# Patient Record
Sex: MALE | Race: WHITE | NOT HISPANIC OR LATINO | Employment: FULL TIME | URBAN - METROPOLITAN AREA
[De-identification: names, ages, dates, MRNs, and addresses within clinical notes are randomized per-mention and may not be internally consistent; named-entity substitution may affect disease eponyms.]

---

## 2024-06-26 ENCOUNTER — APPOINTMENT (OUTPATIENT)
Dept: PRIMARY CARE | Facility: CLINIC | Age: 44
End: 2024-06-26
Payer: COMMERCIAL

## 2024-06-26 ENCOUNTER — LAB (OUTPATIENT)
Dept: LAB | Facility: LAB | Age: 44
End: 2024-06-26
Payer: COMMERCIAL

## 2024-06-26 VITALS
OXYGEN SATURATION: 98 % | BODY MASS INDEX: 26.41 KG/M2 | DIASTOLIC BLOOD PRESSURE: 79 MMHG | HEIGHT: 72 IN | HEART RATE: 71 BPM | WEIGHT: 195 LBS | SYSTOLIC BLOOD PRESSURE: 121 MMHG

## 2024-06-26 DIAGNOSIS — E78.5 DYSLIPIDEMIA: ICD-10-CM

## 2024-06-26 DIAGNOSIS — Z78.9 UNHEALTHY ALCOHOL CONSUMPTION: ICD-10-CM

## 2024-06-26 DIAGNOSIS — Z00.00 ENCOUNTER FOR PREVENTATIVE ADULT HEALTH CARE EXAMINATION: ICD-10-CM

## 2024-06-26 DIAGNOSIS — F41.0 PANIC: ICD-10-CM

## 2024-06-26 DIAGNOSIS — Z71.84 COUNSELING FOR TRAVEL: Primary | ICD-10-CM

## 2024-06-26 DIAGNOSIS — Z01.84 IMMUNITY STATUS TESTING: ICD-10-CM

## 2024-06-26 LAB
25(OH)D3 SERPL-MCNC: 42 NG/ML (ref 30–100)
ALBUMIN SERPL BCP-MCNC: 4.8 G/DL (ref 3.4–5)
ALP SERPL-CCNC: 43 U/L (ref 33–120)
ALT SERPL W P-5'-P-CCNC: 23 U/L (ref 10–52)
ANION GAP SERPL CALC-SCNC: 14 MMOL/L (ref 10–20)
AST SERPL W P-5'-P-CCNC: 17 U/L (ref 9–39)
BASOPHILS # BLD AUTO: 0.04 X10*3/UL (ref 0–0.1)
BASOPHILS NFR BLD AUTO: 0.8 %
BILIRUB SERPL-MCNC: 0.6 MG/DL (ref 0–1.2)
BUN SERPL-MCNC: 15 MG/DL (ref 6–23)
CALCIUM SERPL-MCNC: 10 MG/DL (ref 8.6–10.6)
CHLORIDE SERPL-SCNC: 104 MMOL/L (ref 98–107)
CHOLEST SERPL-MCNC: 240 MG/DL (ref 0–199)
CHOLESTEROL/HDL RATIO: 3.5
CO2 SERPL-SCNC: 27 MMOL/L (ref 21–32)
CREAT SERPL-MCNC: 1.11 MG/DL (ref 0.5–1.3)
EGFRCR SERPLBLD CKD-EPI 2021: 85 ML/MIN/1.73M*2
EOSINOPHIL # BLD AUTO: 0.01 X10*3/UL (ref 0–0.7)
EOSINOPHIL NFR BLD AUTO: 0.2 %
ERYTHROCYTE [DISTWIDTH] IN BLOOD BY AUTOMATED COUNT: 13.2 % (ref 11.5–14.5)
EST. AVERAGE GLUCOSE BLD GHB EST-MCNC: 103 MG/DL
GLUCOSE SERPL-MCNC: 98 MG/DL (ref 74–99)
HBA1C MFR BLD: 5.2 %
HBV SURFACE AB SER-ACNC: <3.1 MIU/ML
HCT VFR BLD AUTO: 45.9 % (ref 41–52)
HDLC SERPL-MCNC: 69.5 MG/DL
HGB BLD-MCNC: 15.4 G/DL (ref 13.5–17.5)
IMM GRANULOCYTES # BLD AUTO: 0.02 X10*3/UL (ref 0–0.7)
IMM GRANULOCYTES NFR BLD AUTO: 0.4 % (ref 0–0.9)
LDLC SERPL CALC-MCNC: 152 MG/DL
LYMPHOCYTES # BLD AUTO: 1.87 X10*3/UL (ref 1.2–4.8)
LYMPHOCYTES NFR BLD AUTO: 39.4 %
MCH RBC QN AUTO: 29.8 PG (ref 26–34)
MCHC RBC AUTO-ENTMCNC: 33.6 G/DL (ref 32–36)
MCV RBC AUTO: 89 FL (ref 80–100)
MONOCYTES # BLD AUTO: 0.37 X10*3/UL (ref 0.1–1)
MONOCYTES NFR BLD AUTO: 7.8 %
NEUTROPHILS # BLD AUTO: 2.44 X10*3/UL (ref 1.2–7.7)
NEUTROPHILS NFR BLD AUTO: 51.4 %
NON HDL CHOLESTEROL: 171 MG/DL (ref 0–149)
NRBC BLD-RTO: 0 /100 WBCS (ref 0–0)
PLATELET # BLD AUTO: 284 X10*3/UL (ref 150–450)
POTASSIUM SERPL-SCNC: 4.3 MMOL/L (ref 3.5–5.3)
PROT SERPL-MCNC: 7.2 G/DL (ref 6.4–8.2)
RBC # BLD AUTO: 5.17 X10*6/UL (ref 4.5–5.9)
SODIUM SERPL-SCNC: 141 MMOL/L (ref 136–145)
TRIGL SERPL-MCNC: 92 MG/DL (ref 0–149)
TSH SERPL-ACNC: 1.07 MIU/L (ref 0.44–3.98)
VLDL: 18 MG/DL (ref 0–40)
WBC # BLD AUTO: 4.8 X10*3/UL (ref 4.4–11.3)

## 2024-06-26 PROCEDURE — 84443 ASSAY THYROID STIM HORMONE: CPT

## 2024-06-26 PROCEDURE — 1036F TOBACCO NON-USER: CPT | Performed by: INTERNAL MEDICINE

## 2024-06-26 PROCEDURE — 83036 HEMOGLOBIN GLYCOSYLATED A1C: CPT

## 2024-06-26 PROCEDURE — 85025 COMPLETE CBC W/AUTO DIFF WBC: CPT

## 2024-06-26 PROCEDURE — 82172 ASSAY OF APOLIPOPROTEIN: CPT

## 2024-06-26 PROCEDURE — 99396 PREV VISIT EST AGE 40-64: CPT | Performed by: INTERNAL MEDICINE

## 2024-06-26 PROCEDURE — 82306 VITAMIN D 25 HYDROXY: CPT

## 2024-06-26 PROCEDURE — 36415 COLL VENOUS BLD VENIPUNCTURE: CPT

## 2024-06-26 PROCEDURE — 86706 HEP B SURFACE ANTIBODY: CPT

## 2024-06-26 PROCEDURE — 80053 COMPREHEN METABOLIC PANEL: CPT

## 2024-06-26 PROCEDURE — 80061 LIPID PANEL: CPT

## 2024-06-26 RX ORDER — CLONAZEPAM 1 MG/1
TABLET ORAL
Qty: 30 TABLET | Refills: 0 | Status: SHIPPED | OUTPATIENT
Start: 2024-06-26

## 2024-06-26 RX ORDER — CHLORHEXIDINE GLUCONATE ORAL RINSE 1.2 MG/ML
SOLUTION DENTAL
COMMUNITY
Start: 2024-01-02 | End: 2024-06-26 | Stop reason: WASHOUT

## 2024-06-26 RX ORDER — AMOXICILLIN AND CLAVULANATE POTASSIUM 875; 125 MG/1; MG/1
1 TABLET, FILM COATED ORAL
COMMUNITY
Start: 2024-03-30 | End: 2024-06-26 | Stop reason: WASHOUT

## 2024-06-26 RX ORDER — ATORVASTATIN CALCIUM 20 MG/1
1 TABLET, FILM COATED ORAL DAILY
COMMUNITY
Start: 2021-12-07 | End: 2024-06-26 | Stop reason: WASHOUT

## 2024-06-26 ASSESSMENT — PATIENT HEALTH QUESTIONNAIRE - PHQ9
SUM OF ALL RESPONSES TO PHQ9 QUESTIONS 1 AND 2: 0
2. FEELING DOWN, DEPRESSED OR HOPELESS: NOT AT ALL
1. LITTLE INTEREST OR PLEASURE IN DOING THINGS: NOT AT ALL

## 2024-06-26 ASSESSMENT — PAIN SCALES - GENERAL: PAINLEVEL: 0-NO PAIN

## 2024-06-26 NOTE — ASSESSMENT & PLAN NOTE
Travel phobia and anxiety  Will initiate clonazepam prior to travel.  Potential side effects management expectations reviewed.  Use sparingly.  CSA signed 6/24

## 2024-06-26 NOTE — ASSESSMENT & PLAN NOTE
Calcium score in 2020 of 0 previously on atorvastatin 20 mg tolerated side effects.  Strong family history of ASCVD  Check LP(a)  Recheck lipid profile and likely reinstitute atorvastatin, dosage depending on levels found.  Repeat calcium score next year

## 2024-06-26 NOTE — PATIENT INSTRUCTIONS
Jurgen,  I have ordered blood and/or urine tests for you to do today. The lab can be found on this floor (2nd floor) next to the pharmacy across from the elevators.    Reduce alcohol intake   Clonazepam has been sent to your pharmacy. Use 60 minutes prior to travel.

## 2024-06-26 NOTE — ASSESSMENT & PLAN NOTE
Does not satisfy criteria for AUD, strongly recommended reduction in alcohol intake intake.  Impact of alcohol on cardiovascular health.

## 2024-06-26 NOTE — PROGRESS NOTES
Subjective     Patient ID: Jurgen Desir is a 44 y.o. male who presents for New Patient Visit and Establish Care (/).  HPI  44-year-old male new here for establishment of care for preventive care visit.  Previously seen by Dr. Craig,  last seen 12/21.    [facility] day woke up in the middle of the night with palpitations which caused panic symptoms seen by EMS initially evaluated found no concerning abnormalities thought perhaps related to panic attack did not get seen in the EMS. Admits this occurring during periods of stress     Past medical history  -HLD- previously atorvastatin 20 mg discontinued after did not followup, +strong paternal family history of heart disease, CAC 0 in 2020   - Travel anxiety - prefers not to drink when coping on the plane. Gets palpitations, diaphoresis. Farthest he goes is [place], has tried benzo in the past wihtout adverse events.     Social:   - Former smoker a few cigarettes a day late teens to early 20s quit   - Alcohol - 2-3 glasses of wine a night, trying to reduce to weekends only. Typically used as a stress response, enjoyment factor between his wife.   -  operations for [employer]   - Lives with wife and 2 kids (7 and 2), currently undergoing house renovation.     Lifestyle   - Diet - does intermittent fasting, coffee in morning, protein shake at lunch, healthy dinner that he cooks. Likes to eat healthy. Perhaps too much red meat on weekends, lots of fish and chicken. When drinks alcohol eats kids snacks.   - Exercise - 5d/week, resistance bands at higher heart rate or weights, may do 10 minutes on elliptical, heart rate 130-150bpm.   -Sleep  - well, 7-8 hours no interruption.   Review of Systems  General: No constitutional symptoms, significant changes in weight  HEENT: No headaches, changes in vision or hearing, no sinus or dental issues   Cardiac: No chest pain, palpitations, dyspnea on exertion   Lungs: No cough, wheezing, shortness of breath   Abdomen: No  abdominal pain, nausea/vomiting, diarrhea or constipation   : No urinary complaints   Musculoskeletal: no joint pains, swelling or tenderness   Heme: No bleeding or thrombosis issues   Lymph: No swollen lymph glands   Skin: No rashes or lesions   Psych: No reported anxiety or depression   All other systems reviewed and are negative      Objective       Current Outpatient Medications:     clonazePAM (KlonoPIN) 1 mg tablet, Take 1 tablet as needed 1 hour prior to flight, Disp: 30 tablet, Rfl: 0    /79   Pulse 71   Ht 1.829 m (6')   Wt 88.5 kg (195 lb)   SpO2 98%   BMI 26.45 kg/m²       Physical Examination  General: Awake, alert, appears stated age   Head/eyes/ears: NCAT, EOMI, PERRL, TM WNL, no cerumen  Throat: mucus membranes moist, pharynx unremarkable   Neck: Supple, nontender, no lymphadenopathy, thyroid exam unremarkable   Heart: RRR, no murmurs, rubs or gallops  Lungs: No wheezes, rhonchi or whales,  Abdomen: Soft, NT/ND  Extremities: No edema, 2+ DP pulses   Testicular exam offered and declined  Skin: No concerning skin lesions on visualized skin   Neuro: AAO x 3, no FND, gait unremarkable      Assessment/Plan   Problem List Items Addressed This Visit      Adult Health Examination  Age appropriate screening performed   Healthy lifestyle reviewed.   Depression screen negative   No additional pertinent family history or toxic habits   No high risk sexual behavior declines STI screen has had one time HCV and HIV tests   Cancer screen   - Colonoscopy at 45  - PSA screen at 50  - Discussed testicular self exams  - Skin cancer prevention strategies reviewed   Immunizations: Flu UTD, Tdap today, consider COVID booster, screen for HBV immunity   Dental and visual exams UTD   Discussed adequate vitamin D intake.      Counseling for travel - Primary     Travel phobia and anxiety  Will initiate clonazepam prior to travel.  Potential side effects management expectations reviewed.  Use sparingly.  CSA signed  6/24              Relevant Medications    clonazePAM (KlonoPIN) 1 mg tablet    Dyslipidemia     Calcium score in 2020 of 0 previously on atorvastatin 20 mg tolerated side effects.  Strong family history of ASCVD  Check LP(a)  Recheck lipid profile and likely reinstitute atorvastatin, dosage depending on levels found.  Repeat calcium score next year         Relevant Orders    Lipoprotein A (LPA)    Unhealthy alcohol consumption     Does not satisfy criteria for AUD, strongly recommended reduction in alcohol intake intake.  Impact of alcohol on cardiovascular health.          Other Visit Diagnoses       Encounter for preventative adult health care examination        Relevant Orders    Hemoglobin A1C    Comprehensive Metabolic Panel    CBC and Auto Differential    TSH with reflex to Free T4 if abnormal    Vitamin D 25-Hydroxy,Total (for eval of Vitamin D levels)    Lipid Panel    Follow Up In [facility] - PCP - Health Maintenance    Immunity status testing        Relevant Orders    Hepatitis B Surface Antibody    Panic        In the setting of multiple life stressors, declines anxiety or depression.  No further episodes.  Not interested in further workup at this time.          Followup 1 year or as needed

## 2024-06-28 LAB — LPA SERPL-MCNC: 148 MG/DL

## 2024-06-30 DIAGNOSIS — E78.5 DYSLIPIDEMIA: Primary | ICD-10-CM

## 2024-06-30 DIAGNOSIS — Z00.00 ENCOUNTER FOR PREVENTATIVE ADULT HEALTH CARE EXAMINATION: ICD-10-CM

## 2024-06-30 RX ORDER — ATORVASTATIN CALCIUM 20 MG/1
20 TABLET, FILM COATED ORAL DAILY
Qty: 90 TABLET | Refills: 0 | Status: SHIPPED | OUTPATIENT
Start: 2024-06-30

## 2024-10-14 DIAGNOSIS — E78.5 DYSLIPIDEMIA: ICD-10-CM

## 2024-10-14 RX ORDER — ATORVASTATIN CALCIUM 20 MG/1
20 TABLET, FILM COATED ORAL DAILY
Qty: 90 TABLET | Refills: 0 | Status: SHIPPED | OUTPATIENT
Start: 2024-10-14

## 2025-02-04 DIAGNOSIS — E78.5 DYSLIPIDEMIA: ICD-10-CM

## 2025-02-04 RX ORDER — ATORVASTATIN CALCIUM 20 MG/1
20 TABLET, FILM COATED ORAL DAILY
Qty: 90 TABLET | Refills: 1 | Status: SHIPPED | OUTPATIENT
Start: 2025-02-04

## 2025-06-17 ENCOUNTER — APPOINTMENT (OUTPATIENT)
Dept: PRIMARY CARE | Facility: CLINIC | Age: 45
End: 2025-06-17
Payer: COMMERCIAL

## 2025-06-17 VITALS
HEART RATE: 74 BPM | HEIGHT: 73 IN | WEIGHT: 202.2 LBS | BODY MASS INDEX: 26.8 KG/M2 | SYSTOLIC BLOOD PRESSURE: 123 MMHG | TEMPERATURE: 97.2 F | DIASTOLIC BLOOD PRESSURE: 79 MMHG

## 2025-06-17 DIAGNOSIS — Z71.84 COUNSELING FOR TRAVEL: ICD-10-CM

## 2025-06-17 DIAGNOSIS — Z00.00 ENCOUNTER FOR PREVENTATIVE ADULT HEALTH CARE EXAMINATION: Primary | ICD-10-CM

## 2025-06-17 DIAGNOSIS — Z23 IMMUNIZATION DUE: ICD-10-CM

## 2025-06-17 DIAGNOSIS — E78.5 DYSLIPIDEMIA: ICD-10-CM

## 2025-06-17 PROCEDURE — 1036F TOBACCO NON-USER: CPT | Performed by: INTERNAL MEDICINE

## 2025-06-17 PROCEDURE — 90471 IMMUNIZATION ADMIN: CPT | Performed by: INTERNAL MEDICINE

## 2025-06-17 PROCEDURE — 90739 HEPB VACC 2/4 DOSE ADULT IM: CPT | Performed by: INTERNAL MEDICINE

## 2025-06-17 PROCEDURE — 99396 PREV VISIT EST AGE 40-64: CPT | Performed by: INTERNAL MEDICINE

## 2025-06-17 PROCEDURE — 3008F BODY MASS INDEX DOCD: CPT | Performed by: INTERNAL MEDICINE

## 2025-06-17 RX ORDER — CLONAZEPAM 1 MG/1
TABLET ORAL
Qty: 30 TABLET | Refills: 0 | Status: SHIPPED | OUTPATIENT
Start: 2025-06-17

## 2025-06-17 ASSESSMENT — PATIENT HEALTH QUESTIONNAIRE - PHQ9
2. FEELING DOWN, DEPRESSED OR HOPELESS: NOT AT ALL
SUM OF ALL RESPONSES TO PHQ9 QUESTIONS 1 & 2: 0
1. LITTLE INTEREST OR PLEASURE IN DOING THINGS: NOT AT ALL

## 2025-06-17 ASSESSMENT — PAIN SCALES - GENERAL: PAINLEVEL_OUTOF10: 0-NO PAIN

## 2025-06-17 NOTE — PATIENT INSTRUCTIONS
Jurgen,   Labs including bloodwork and/or urine is ordered today. The lab can be found on this floor (2nd floor) next to the pharmacy across from the elevators.    Continue healthy lifestyle - work on 7-8 hours uninterrupted sleep     Followup 1 year

## 2025-06-17 NOTE — PROGRESS NOTES
Subjective   Patient ID: Jurgen Desir is a 45 y.o. male who presents for Follow-up and Annual Exam.  History of Present Illness  45-year-old male here for preventive care visit, last seen last year.    6/24: lpa high, ldl 154 start atorvastatin 20mg.   Hep b not immune.     He has been taking his cholesterol medication regularly without any side effects. However, he has not had recent blood work to monitor his cholesterol levels. No chest pain, palpitations, or shortness of breath.    His anxiety medication is effective, particularly during flights, as it aids in sleep. He is nearly out of this medication and requires a refill.    He has significantly reduced his alcohol intake from twenty-one drinks per week to four to six drinks per week. He attributes part of this reduction to drinking decaf coffee at night to avoid sleep disturbances.    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 He describes the job as busy but rewarding. He exercises daily, incorporating more cardio and resistance band workouts into his routine. He sleeps about six hours per night, which he attributes to his new job schedule and coffee consumption. No symptoms of depression, stating 'life is good'.    No tobacco or drug use. No changes in vision or dental health, although he has had gum issues in the past. He regularly uses sunscreen and has no skin abnormalities.    His son, who previously had speech problems, is now starting to talk, which has improved communication and reduced frustration at home.      Past medical history  -HLD- previously atorvastatin 20 mg discontinued after did not followup, +strong paternal family history of heart disease, CAC 0 in 2020, LP(a) high started atorvastatin 20 mg at last visit  - Travel anxiety - prefers not to drink when coping on the plane. Gets palpitations, diaphoresis. Farthest he goes is [place], prescribed  "clonazepam at last visit, still travels significantly.     Social:   - Former smoker a few cigarettes a day late teens to early 20s quit   - Alcohol - 2-3 glasses of wine a night, trying to reduce to weekends only. Typically used as a stress response, enjoyment factor between his wife. Screen negative for AUD, switched to deaf at night, total of 4-6 drinks a week.   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   - Lives with wife and 2 kids (8 and 3)     Lifestyle   - Diet - does intermittent fasting, coffee in morning, protein shake at lunch, healthy dinner that he cooks. Likes to eat healthy. Perhaps too much red meat on weekends, lots of fish and chicken. When drinks alcohol eats kids snacks.   - Exercise - weights and cardio every day for the last 2 months.   -Sleep  - now 6 hours, no interruptions exercises in the morning.      PMHx, FHx, Social Hx, Surg Hx personally reviewed at this appointment. No pertinent findings and/or changes from prior (if applicable).      Objective     /79   Pulse 74   Temp 36.2 °C (97.2 °F) (Temporal)   Ht 1.854 m (6' 1\")   Wt 91.7 kg (202 lb 3.2 oz)   BMI 26.68 kg/m²    General: Awake, alert, appears stated age   Head/eyes/ears: NCAT, EOMI, PERRL, TM WNL, no cerumen  Throat: mucus membranes moist, pharynx unremarkable   Neck: Supple, nontender, no lymphadenopathy, thyroid exam unremarkable   Heart: RRR, no murmurs, rubs or gallops  Lungs: No wheezes, rhonchi or whales,  Abdomen: Soft, NT/ND  Extremities: No edema, 2+ DP pulses   Skin: No concerning skin lesions on visualized skin   Neuro: AAO x 3, no FND, gait unremarkable       Lab Results   Component Value Date    WBC 4.8 06/26/2024    HGB 15.4 06/26/2024    HCT 45.9 06/26/2024     06/26/2024    CHOL 240 (H) 06/26/2024    TRIG 92 06/26/2024    HDL 69.5 06/26/2024    ALT 23 06/26/2024    AST 17 06/26/2024     06/26/2024    K 4.3 06/26/2024     06/26/2024    CREATININE 1.11 06/26/2024    " BUN 15 06/26/2024    CO2 27 06/26/2024    TSH 1.07 06/26/2024    HGBA1C 5.2 06/26/2024         Current Outpatient Medications   Medication Instructions    atorvastatin (LIPITOR) 20 mg, oral, Daily    clonazePAM (KlonoPIN) 1 mg tablet Take 1 tablet as needed 1 hour prior to flight        CT HEART CALCIUM SCORING WO IV CONTRAST  MRN: [id number]  Patient Name: ABILIO SARAVIA     STUDY:  CT CARDIAC SCORING; 1/10/2020 3:10 pm     INDICATION:  hyperlipidemia, fam hx cad.     COMPARISON:  None.     ACCESSION NUMBER(S):  [id number]     ORDERING CLINICIAN:  NAVIN JOSUE     TECHNIQUE:  Using prospective ECG gating, CT scan of the coronary arteries was  performed without intravenous contrast. Coronary calcium scoring  was  performed according to the method of Agatston.     FINDINGS:  The score and distribution of calcium in the coronary arteries is as  follows:     LM             0  LAD           0  LCx            0  RCA           0     Total         0     The visualized mid/lower ascending thoracic aorta measures 2.5 cm in  diameter. The heart is normal in size. No pericardial effusion is  present.     No gross evidence of mediastinal or hilar lymphadenopathy or masses  is identified. The visualized segments of the lungs are normally  expanded.     The visualized subdiaphragmatic structures appear intact.     IMPRESSION:  1. Coronary artery calcium score of 0*.     *Coronary Artery Calcium Gated and Nongated Agatston score  Score                                      Risk  0                                       Very low  1-99                                  Mildly increased  100-299                             Moderately increased  >300                                Moderate to severely increased     Shama et al. JCCT 2016 (http://dx.[email or web address]/10.1016/j.[email or web address])     LUBIN 10-Year CHD Risk with Coronary Artery Calcification can be  calcuated using link  below  [email or web address]  Cm et al. JACC 2015 (http://dx.[email or web address]/10.1016/j.j  [email or web address])     I personally reviewed the image(s)/study and I agree with the  resident, Dr. Montes's findings as stated.  This study was interpreted at [facility].           Assessment & Plan  Adult Health Examination  Age appropriate screening performed   Depression screen negative   No additional pertinent family history or toxic habits   No high risk sexual behavior declines STI Screen   Cancer screen   - Colonoscopy at 45  - PSA screen at 50   - Skin cancer prevention strategies reviewed  Immunizations:   - Due: Hepatitis B amenable covid declined   - UTD: Tdap   Dental and visual exams UTD   Discussed adequate vitamin D intake.      Hyperlipidemia  Cholesterol medication effective without side effects. LDL target under 55 mg/dL due to cardiovascular risk.  - Continue current cholesterol medication.  - Aim to reduce LDL to under 55 mg/dL.  - Declines repeat CACs,     Anxiety  Clonazepam effective for travel-related anxiety. Discussed risks of addiction, dependence, tolerance, overdose, and death. Advised adherence to dosage and not sharing medication.  - Refill clonazepam prescription for travel-related anxiety.  This patient is taking a medication with addiction potential.   We have determined that this medication is beneficial to the patient.   They have signed a contract today 6/17/25     Alcohol Use Reduction  Alcohol intake reduced from 21 to 4-6 drinks per week. Further reduction encouraged.  - Continue current alcohol reduction strategy.  - Consider further reduction of alcohol intake.      Follow up 1 year       Adriana Renae DO       This medical note was created with the assistance of artificial intelligence (AI) for documentation purposes. The content has been reviewed and confirmed by the healthcare provider for accuracy  and completeness. Patient consented to the use of audio recording and use of AI during their visit.

## 2025-07-17 ENCOUNTER — APPOINTMENT (OUTPATIENT)
Dept: PRIMARY CARE | Facility: CLINIC | Age: 45
End: 2025-07-17
Payer: COMMERCIAL

## 2025-07-17 DIAGNOSIS — Z23 ENCOUNTER FOR IMMUNIZATION: Primary | ICD-10-CM

## 2025-07-17 PROCEDURE — 90739 HEPB VACC 2/4 DOSE ADULT IM: CPT | Performed by: INTERNAL MEDICINE

## 2025-07-17 PROCEDURE — 90471 IMMUNIZATION ADMIN: CPT | Performed by: INTERNAL MEDICINE

## 2025-07-17 NOTE — PROGRESS NOTES
Subjective   Patient ID: Jurgen Desir is a 45 y.o. male who presents for Hep B vaccine administered in his left deltoid.    HPI     Review of Systems    Objective   There were no vitals taken for this visit.    Physical Exam    Assessment/Plan

## 2025-08-07 DIAGNOSIS — E78.5 DYSLIPIDEMIA: ICD-10-CM

## 2025-08-08 RX ORDER — ATORVASTATIN CALCIUM 20 MG/1
20 TABLET, FILM COATED ORAL DAILY
Qty: 90 TABLET | Refills: 1 | Status: SHIPPED | OUTPATIENT
Start: 2025-08-08

## 2025-08-20 LAB
ALBUMIN SERPL-MCNC: 4.8 G/DL (ref 3.6–5.1)
ALP SERPL-CCNC: 42 U/L (ref 36–130)
ALT SERPL-CCNC: 27 U/L (ref 9–46)
ANION GAP SERPL CALCULATED.4IONS-SCNC: 10 MMOL/L (CALC) (ref 7–17)
AST SERPL-CCNC: 21 U/L (ref 10–40)
BASOPHILS # BLD AUTO: 47 CELLS/UL (ref 0–200)
BASOPHILS NFR BLD AUTO: 0.6 %
BILIRUB SERPL-MCNC: 0.6 MG/DL (ref 0.2–1.2)
BUN SERPL-MCNC: 22 MG/DL (ref 7–25)
CALCIUM SERPL-MCNC: 9.7 MG/DL (ref 8.6–10.3)
CHLORIDE SERPL-SCNC: 104 MMOL/L (ref 98–110)
CHOLEST SERPL-MCNC: 162 MG/DL
CHOLEST/HDLC SERPL: 2.5 (CALC)
CO2 SERPL-SCNC: 27 MMOL/L (ref 20–32)
CREAT SERPL-MCNC: 1.24 MG/DL (ref 0.6–1.29)
EGFRCR SERPLBLD CKD-EPI 2021: 74 ML/MIN/1.73M2
EOSINOPHIL # BLD AUTO: 32 CELLS/UL (ref 15–500)
EOSINOPHIL NFR BLD AUTO: 0.4 %
ERYTHROCYTE [DISTWIDTH] IN BLOOD BY AUTOMATED COUNT: 13.2 % (ref 11–15)
EST. AVERAGE GLUCOSE BLD GHB EST-MCNC: 111 MG/DL
EST. AVERAGE GLUCOSE BLD GHB EST-SCNC: 6.2 MMOL/L
GLUCOSE SERPL-MCNC: 70 MG/DL (ref 65–139)
HBA1C MFR BLD: 5.5 %
HCT VFR BLD AUTO: 45.1 % (ref 38.5–50)
HDLC SERPL-MCNC: 65 MG/DL
HGB BLD-MCNC: 15 G/DL (ref 13.2–17.1)
LDLC SERPL CALC-MCNC: 72 MG/DL (CALC)
LYMPHOCYTES # BLD AUTO: 1849 CELLS/UL (ref 850–3900)
LYMPHOCYTES NFR BLD AUTO: 23.4 %
MCH RBC QN AUTO: 31 PG (ref 27–33)
MCHC RBC AUTO-ENTMCNC: 33.3 G/DL (ref 32–36)
MCV RBC AUTO: 93.2 FL (ref 80–100)
MONOCYTES # BLD AUTO: 561 CELLS/UL (ref 200–950)
MONOCYTES NFR BLD AUTO: 7.1 %
NEUTROPHILS # BLD AUTO: 5412 CELLS/UL (ref 1500–7800)
NEUTROPHILS NFR BLD AUTO: 68.5 %
NONHDLC SERPL-MCNC: 97 MG/DL (CALC)
PLATELET # BLD AUTO: 260 THOUSAND/UL (ref 140–400)
PMV BLD REES-ECKER: 10.8 FL (ref 7.5–12.5)
POTASSIUM SERPL-SCNC: 4.2 MMOL/L (ref 3.5–5.3)
PROT SERPL-MCNC: 6.9 G/DL (ref 6.1–8.1)
RBC # BLD AUTO: 4.84 MILLION/UL (ref 4.2–5.8)
SODIUM SERPL-SCNC: 141 MMOL/L (ref 135–146)
TRIGL SERPL-MCNC: 170 MG/DL
TSH SERPL-ACNC: 0.62 MIU/L (ref 0.4–4.5)
WBC # BLD AUTO: 7.9 THOUSAND/UL (ref 3.8–10.8)